# Patient Record
Sex: FEMALE | Race: WHITE | NOT HISPANIC OR LATINO | Employment: OTHER | URBAN - METROPOLITAN AREA
[De-identification: names, ages, dates, MRNs, and addresses within clinical notes are randomized per-mention and may not be internally consistent; named-entity substitution may affect disease eponyms.]

---

## 2018-03-19 NOTE — PATIENT DISCUSSION
Patient educated with the Basic + option, they will most likely need prescription glasses at all focal points after surgery.

## 2018-03-19 NOTE — PATIENT DISCUSSION
Patient educated with [employer] [facility] for distance, they will need glasses for all near and intermediate activities after surgery. Patient educated there is a 10% chance of needing enhancement after surgery. Patient would like to think about options for surgery and let us know what she decides. Most likely between Basic + and Custom OD, goal of emmetropia.

## 2018-06-07 ENCOUNTER — NEW PATIENT COMPREHENSIVE (OUTPATIENT)
Dept: URBAN - METROPOLITAN AREA CLINIC 39 | Facility: CLINIC | Age: 83
End: 2018-06-07

## 2018-06-07 DIAGNOSIS — Z96.1: ICD-10-CM

## 2018-06-07 DIAGNOSIS — H35.372: ICD-10-CM

## 2018-06-07 PROCEDURE — 1036F TOBACCO NON-USER: CPT

## 2018-06-07 PROCEDURE — G8427 DOCREV CUR MEDS BY ELIG CLIN: HCPCS

## 2018-06-07 PROCEDURE — G8785 BP SCRN NO PERF AT INTERVAL: HCPCS

## 2018-06-07 PROCEDURE — 92015 DETERMINE REFRACTIVE STATE: CPT

## 2018-06-07 PROCEDURE — 92134 CPTRZ OPH DX IMG PST SGM RTA: CPT

## 2018-06-07 PROCEDURE — 92004 COMPRE OPH EXAM NEW PT 1/>: CPT

## 2018-06-07 ASSESSMENT — VISUAL ACUITY
OD_CC: 20/25
OS_CC: 20/70
OD_CC: J2
OD_SC: 20/40-1
OS_CC: J8-
OS_SC: 20/200
OS_SC: J12-
OD_SC: J12

## 2018-06-07 ASSESSMENT — TONOMETRY
OD_IOP_MMHG: 17
OS_IOP_MMHG: 17

## 2022-04-08 NOTE — PATIENT DISCUSSION
Patient educated with the Basic option, they will most likely need prescription glasses at all focal points after surgery. *Dr. Bacilio Johnston patient*.

## 2022-04-08 NOTE — PATIENT DISCUSSION
Patient educated with [employer] for distance, they will need glasses for all near and intermediate activities after surgery. Patient educated there is a 5% chance of needing enhancement after surgery. Patient would like to think about options for surgery and let us know what she decides. Most likely between Basic + and Custom OD, goal of emmetropia.

## 2022-05-05 NOTE — PATIENT DISCUSSION
Patient advised of the right to post-operative care by the surgeon. Patient is fully informed of, and agreed to, co-management with their primary optometric physician. Post-operative care by the surgeon is not medically necessary and co-management is clinically appropriate. Patient has received itemization of fees related to cataract surgery. Transfer of care letter completed for the patient. Transfer care of right eye to Dr. Lance Victor on 5.6.2022. Patient instructed to call immediately if any new distortion, blurring, decreased vision or eye pain.

## 2022-05-05 NOTE — PATIENT DISCUSSION
Patient educated with the Basic option, they will most likely need prescription glasses at all focal points after surgery. *Dr. Rodriguez Counts patient*.

## 2022-05-05 NOTE — PATIENT DISCUSSION
Patient educated with the Basic option, they will most likely need prescription glasses at all focal points after surgery. *Dr. Priti Chester patient*.

## 2022-05-05 NOTE — PATIENT DISCUSSION
Patient educated with the Basic option, they will most likely need prescription glasses at all focal points after surgery. *Dr. Dieudonne Gonzales patient*.